# Patient Record
Sex: MALE | Race: WHITE | NOT HISPANIC OR LATINO | URBAN - METROPOLITAN AREA
[De-identification: names, ages, dates, MRNs, and addresses within clinical notes are randomized per-mention and may not be internally consistent; named-entity substitution may affect disease eponyms.]

---

## 2023-04-06 ENCOUNTER — OFFICE (OUTPATIENT)
Dept: URBAN - METROPOLITAN AREA CLINIC 105 | Facility: CLINIC | Age: 43
End: 2023-04-06

## 2023-04-06 VITALS
WEIGHT: 201 LBS | DIASTOLIC BLOOD PRESSURE: 61 MMHG | HEART RATE: 60 BPM | OXYGEN SATURATION: 95 % | HEIGHT: 65 IN | SYSTOLIC BLOOD PRESSURE: 111 MMHG

## 2023-04-06 DIAGNOSIS — K58.0 IRRITABLE BOWEL SYNDROME WITH DIARRHEA: ICD-10-CM

## 2023-04-06 DIAGNOSIS — K21.00 GASTRO-ESOPHAGEAL REFLUX DISEASE WITH ESOPHAGITIS, WITHOUT B: ICD-10-CM

## 2023-04-06 DIAGNOSIS — R10.13 EPIGASTRIC PAIN: ICD-10-CM

## 2023-04-06 DIAGNOSIS — K44.9 DIAPHRAGMATIC HERNIA WITHOUT OBSTRUCTION OR GANGRENE: ICD-10-CM

## 2023-04-06 PROCEDURE — 99204 OFFICE O/P NEW MOD 45 MIN: CPT

## 2023-04-06 RX ORDER — FAMOTIDINE 40 MG/1
TABLET, FILM COATED ORAL
Qty: 90 | Refills: 1 | Status: ACTIVE
Start: 2023-04-06

## 2023-04-06 RX ORDER — HYOSCYAMINE SULFATE 0.12 MG/1
0.38 TABLET ORAL; SUBLINGUAL
Qty: 30 | Refills: 2 | Status: ACTIVE
Start: 2023-04-06

## 2023-04-06 RX ORDER — AMITRIPTYLINE HYDROCHLORIDE 25 MG/1
TABLET, FILM COATED ORAL
Qty: 90 | Refills: 1 | Status: ACTIVE

## 2023-04-06 NOTE — SERVICENOTES
– Ordered blood work
– Ordered esophageal manometry
– We will obtain records from your cardiologist–Dr. Jimenez
– Continue omeprazole 40 mg daily, 30 to 60 minutes before breakfast
– Try famotidine 40 mg at bedtime as needed for breakthrough reflux
– Try hyoscyamine under the tongue every 8 hours as needed
– Increased amitriptyline from 10 mg to 25 mg at bedtime
– Discussed low FODMAP diet and gave you handout
– Follow-up in 2 months

## 2023-04-06 NOTE — SERVICEHPINOTES
Ladarius Irizarry   is seen for an initial visit today.  
br
brPatient previously followed with Dr. Smith in [place], [place]. He had a history of GERD and chest pain. He was previously diagnosed with pericarditis and was treated with colchicine. He subsequently had an EKG and echocardiogram that were normal. He also had reported an episode of hematochezia and hard stool. He had a CTA that was normal. He was taking omeprazole for reflux. Also with nausea and abdominal pain. He was advised to continue PPI daily and Pepcid as needed. He was started on Elavil at 10 mg and advised to increase to 20 mg after a week. He had an H. pylori breath test that was negative. He had a colonoscopy 9/2021 that was normal. He had an EGD 9/2021 with LA grade A esophagitis (proximal esophageal bxs w/ 3 eos per HPF distal esophageal bxs w/ 2 eos per HPF) but otherwise normal. Patient had a barium swallow 2/2022 with small hiatal hernia but otherwise normal.Patient was seen by his PCP–Dr. Alvarado 1/18/2023. He reported having pain in the center of his chest. Some improvement with omeprazole. Also with hiatal hernia. Also with abdominal pain and diarrhea. He was prescribed hyoscyamine sublingual as needed. He was started on buspirone for anxiety. Reviewed labs. Lipid panel normal. CMP normal. Hemoglobin A1c 6.3%. CBC normal 2/2022. TSH normal. 
br
brPatient states that he went to the emergency room several years ago for chest pain and was found to have elevated heart enzymes. He had a left heart cath with Dr. Mulligan that was normal and he was diagnosed with pericarditis. He was treated with antibiotics. He was having GI issues with pain in chest that radiates to left and right side. He was told that he had irritable bowel syndrome. He had an echocardiogram that was normal. He takes omeprazole daily for reflux. There was concern for possible esophageal spasm. He takes amitriptyline 10 mg at bedtime. He is not sure if it helps or not. He states that when he went on his honeymoon in [place] 12/2022 he was drinking more than normal but was getting pain in his chest and burning that would keep him up at night. These episodes caused him to become anxious. He saw his PCP again and his symptoms were thought to be anxiety related. He saw his cardiologist Dr. Jimenez and was offered reassurance. He has not tried hyoscyamine. He feels that the buspirone is helping with his anxiety. He takes omeprazole daily for breakfast. He denies dysphagia, blood in stool or black stool. He often has diarrhea. He has tried sucralfate in the past without improvement in his symptoms. He denies significant NSAID use. Also with associated bloating. He has lost approximately 15 pounds intentionally.

## 2023-04-24 LAB
C-REACTIVE PROTEIN, QUANT: 1.1 MG/DL — HIGH (ref 0.5–?)
CELIAC AB TTG TIGA W/RFLX: IMMUNOGLOBULIN A, QN, SERUM: 195 MG/DL (ref 90–386)
CELIAC AB TTG TIGA W/RFLX: T-TRANSGLUTAMINASE (TTG) IGA: <2 U/ML

## 2023-06-08 ENCOUNTER — OFFICE (OUTPATIENT)
Dept: URBAN - METROPOLITAN AREA CLINIC 105 | Facility: CLINIC | Age: 43
End: 2023-06-08

## 2023-06-08 VITALS — HEIGHT: 65 IN | WEIGHT: 195 LBS

## 2023-06-08 DIAGNOSIS — R79.82 ELEVATED C-REACTIVE PROTEIN (CRP): ICD-10-CM

## 2023-06-08 DIAGNOSIS — K44.9 DIAPHRAGMATIC HERNIA WITHOUT OBSTRUCTION OR GANGRENE: ICD-10-CM

## 2023-06-08 DIAGNOSIS — R10.12 LEFT UPPER QUADRANT PAIN: ICD-10-CM

## 2023-06-08 DIAGNOSIS — K21.00 GASTRO-ESOPHAGEAL REFLUX DISEASE WITH ESOPHAGITIS, WITHOUT B: ICD-10-CM

## 2023-06-08 DIAGNOSIS — K58.0 IRRITABLE BOWEL SYNDROME WITH DIARRHEA: ICD-10-CM

## 2023-06-08 PROCEDURE — 99213 OFFICE O/P EST LOW 20 MIN: CPT | Mod: 95

## 2023-06-08 NOTE — SERVICEHPINOTES
Patient is a 43-year-old man who is here for a telehealth visit for follow-up of GERD with esophagitis, epigastric pain, irritable bowel syndrome with diarrhea and hiatal hernia.brHe c/o pain in the left side under the ribcage. He describes the pain as dull in nature. He experienced the pain when he was in the shower. He reports the pain worsened yesterday. He has not tried hyoscyamine. He has mild reflux issues and takes omeprazole daily. He has bloating sensation. He has diarrhea at times. He endorses lifting heavy weights and notice difficulty breathing while doing so. He has mild improvement with increased dose of amitriptyline. He has history of hiatal hernia. brEsophageal manometry and blood work results were reviewed and discussed with the patient.brDenies nausea or vomiting.br
brPatient presents for follow-up of GERD with esophagitis, epigastric pain, irritable bowel syndrome with diarrhea and hiatal hernia. Patient first seen in clinic 4/6/2023. Patient previously followed with GI physician and COURTNEY Inman. Patient previously treated with PPI daily and Pepcid as needed. He was also started on Elavil which was increased to 20 mg daily. H. pylori breath test negative. He had a colonoscopy 9/2021 that was normal. He had an EGD 9/2021 with LA grade A esophagitis (proximal esophageal bxs w/ 3 eos per HPF distal esophageal bxs w/ 2 eos per HPF) but otherwise normal. Patient had a barium swallow 2/2022 with small hiatal hernia but otherwise normal.Patient states that he takes omeprazole daily for reflux. There was prior concern for esophageal spasm. He was taking amitriptyline 10 mg at bedtime but was unsure if it was helping his symptoms. He had not tried hyoscyamine at that time. He felt like buspirone was helping with his anxiety. He was taking omeprazole daily before breakfast. He had denied dysphagia. He had tried super fate in the past without improvement in his symptoms. He has had an extensive cardiac evaluation that was negative. Patient had esophageal manometry 4/14/2023 with normal IRP, normal amplitude peristalsis, one swallow with premature contraction but overall normal esophageal motility. Patient was advised to continue omeprazole 40 mg daily. A prescription was sent in for famotidine 40 mg at bedtime for breakthrough reflux. He was advised to try hyoscyamine as needed. His amitriptyline was increased from 10 mg to 25 mg at bedtime. He was advised to try low FODMAP diet. Celiac panel negative. CRP mildly elevated at 1.1. br

## 2023-07-28 ENCOUNTER — ON CAMPUS - OUTPATIENT (OUTPATIENT)
Dept: URBAN - METROPOLITAN AREA MEDICAL CENTER 8 | Facility: MEDICAL CENTER | Age: 43
End: 2023-07-28
Payer: COMMERCIAL

## 2023-07-28 DIAGNOSIS — K31.89 OTHER DISEASES OF STOMACH AND DUODENUM: ICD-10-CM

## 2023-07-28 DIAGNOSIS — R19.7 DIARRHEA, UNSPECIFIED: ICD-10-CM

## 2023-07-28 PROCEDURE — 43239 EGD BIOPSY SINGLE/MULTIPLE: CPT | Mod: 51

## 2023-07-28 PROCEDURE — 45380 COLONOSCOPY AND BIOPSY: CPT

## 2023-10-31 ENCOUNTER — TELEHEALTH PROVIDED OTHER THAN IN PATIENT'S HOME (OUTPATIENT)
Dept: URBAN - METROPOLITAN AREA CLINIC 105 | Facility: CLINIC | Age: 43
End: 2023-10-31

## 2023-10-31 VITALS — HEIGHT: 65 IN | WEIGHT: 200 LBS

## 2023-10-31 DIAGNOSIS — K21.9 GASTRO-ESOPHAGEAL REFLUX DISEASE WITHOUT ESOPHAGITIS: ICD-10-CM

## 2023-10-31 DIAGNOSIS — R10.13 EPIGASTRIC PAIN: ICD-10-CM

## 2023-10-31 DIAGNOSIS — E73.9 LACTOSE INTOLERANCE, UNSPECIFIED: ICD-10-CM

## 2023-10-31 PROCEDURE — 99214 OFFICE O/P EST MOD 30 MIN: CPT | Mod: GT

## 2023-10-31 NOTE — SERVICEHPINOTES
Telehealth Platform Used: DoximitybrLocation of patient:  homebrLocation of provider: [facility] officebrOther persons participating: nonebrTime of Visit: 19 min
br
br Briefly, 42-year-old male who presents for evaluation of epigastric pain, GERD, hiatal hernia and diarrhea. Patient previously followed with Dr. Smith in [place], [place]. He had a history of GERD and chest pain. He was previously diagnosed with pericarditis and was treated with colchicine. He subsequently had an EKG and echocardiogram that were normal. He also had reported an episode of hematochezia and hard stool. He had a CTA that was normal. He was taking omeprazole for reflux. Also with nausea and abdominal pain. He was advised to continue PPI daily and Pepcid as needed. He was started on Elavil at 10 mg and advised to increase to 20 mg after a week. He had an H. pylori breath test that was negative. He had a colonoscopy 9/2021 that was normal. He had an EGD 9/2021 with LA grade A esophagitis (proximal esophageal bxs w/ 3 eos per HPF distal esophageal bxs w/ 2 eos per HPF) but otherwise normal. Patient had a barium swallow 2/2022 with small hiatal hernia but otherwise normal.Patient was seen by his PCP–Dr. Alvarado 1/18/2023. He reported having pain in the center of his chest. Some improvement with omeprazole. Also with hiatal hernia. Also with abdominal pain and diarrhea. He was prescribed hyoscyamine sublingual as needed. He was started on buspirone for anxiety. Reviewed labs. Lipid panel normal. CMP normal. Hemoglobin A1c 6.3%.
br7- EGD with [facility] and colonoscopy. Duodenal erosions, erythematous mucosa in the antrum. Colonoscopy normal., Disaccharide testing shows lactase low. Pathology normal/unremarkable [facility] reporting evidence of acid reflux off PPI. total DeMeester score 24.5
br
brPatient still with acid reflux symptoms particularly when laying down at night. Symptoms are not as troubling as previous, he is reassured it is not cardiac in nature. br
